# Patient Record
Sex: MALE | Race: WHITE | NOT HISPANIC OR LATINO | URBAN - METROPOLITAN AREA
[De-identification: names, ages, dates, MRNs, and addresses within clinical notes are randomized per-mention and may not be internally consistent; named-entity substitution may affect disease eponyms.]

---

## 2017-04-08 ENCOUNTER — INPATIENT (INPATIENT)
Facility: HOSPITAL | Age: 57
LOS: 2 days | Discharge: HOME | End: 2017-04-11
Attending: INTERNAL MEDICINE | Admitting: INTERNAL MEDICINE
Payer: COMMERCIAL

## 2017-04-08 VITALS
SYSTOLIC BLOOD PRESSURE: 143 MMHG | TEMPERATURE: 98 F | RESPIRATION RATE: 18 BRPM | HEART RATE: 97 BPM | DIASTOLIC BLOOD PRESSURE: 84 MMHG | OXYGEN SATURATION: 99 %

## 2017-04-08 DIAGNOSIS — S92.909A UNSPECIFIED FRACTURE OF UNSPECIFIED FOOT, INITIAL ENCOUNTER FOR CLOSED FRACTURE: Chronic | ICD-10-CM

## 2017-04-08 LAB
ALBUMIN SERPL ELPH-MCNC: 3.3 G/DL — SIGNIFICANT CHANGE UP (ref 3.3–5)
ALP SERPL-CCNC: 67 U/L — SIGNIFICANT CHANGE UP (ref 40–120)
ALT FLD-CCNC: 28 U/L — SIGNIFICANT CHANGE UP (ref 4–41)
AMPHET UR-MCNC: NEGATIVE — SIGNIFICANT CHANGE UP
APAP SERPL-MCNC: < 15 UG/ML — LOW (ref 15–25)
APPEARANCE UR: CLEAR — SIGNIFICANT CHANGE UP
AST SERPL-CCNC: 28 U/L — SIGNIFICANT CHANGE UP (ref 4–40)
BARBITURATES MEASUREMENT: NEGATIVE — SIGNIFICANT CHANGE UP
BARBITURATES UR SCN-MCNC: NEGATIVE — SIGNIFICANT CHANGE UP
BASOPHILS # BLD AUTO: 0.09 K/UL — SIGNIFICANT CHANGE UP (ref 0–0.2)
BASOPHILS NFR BLD AUTO: 0.8 % — SIGNIFICANT CHANGE UP (ref 0–2)
BASOPHILS NFR SPEC: 0 % — SIGNIFICANT CHANGE UP (ref 0–2)
BENZODIAZ SERPL-MCNC: NEGATIVE — SIGNIFICANT CHANGE UP
BENZODIAZ UR-MCNC: NEGATIVE — SIGNIFICANT CHANGE UP
BILIRUB SERPL-MCNC: 0.3 MG/DL — SIGNIFICANT CHANGE UP (ref 0.2–1.2)
BILIRUB UR-MCNC: NEGATIVE — SIGNIFICANT CHANGE UP
BLOOD UR QL VISUAL: NEGATIVE — SIGNIFICANT CHANGE UP
BUN SERPL-MCNC: 11 MG/DL — SIGNIFICANT CHANGE UP (ref 7–23)
CALCIUM SERPL-MCNC: 9.5 MG/DL — SIGNIFICANT CHANGE UP (ref 8.4–10.5)
CANNABINOIDS UR-MCNC: NEGATIVE — SIGNIFICANT CHANGE UP
CHLORIDE SERPL-SCNC: 96 MMOL/L — LOW (ref 98–107)
CHLORIDE UR-SCNC: 57 MMOL/L — SIGNIFICANT CHANGE UP
CK MB BLD-MCNC: 1 NG/ML — SIGNIFICANT CHANGE UP (ref 1–6.6)
CK MB BLD-MCNC: SIGNIFICANT CHANGE UP (ref 0–2.5)
CK SERPL-CCNC: 29 U/L — LOW (ref 30–200)
CO2 SERPL-SCNC: 24 MMOL/L — SIGNIFICANT CHANGE UP (ref 22–31)
COCAINE METAB.OTHER UR-MCNC: NEGATIVE — SIGNIFICANT CHANGE UP
COLOR SPEC: YELLOW — SIGNIFICANT CHANGE UP
CORTIS SERPL-MCNC: 7.2 UG/DL — SIGNIFICANT CHANGE UP (ref 2.7–18.4)
CREAT ?TM UR-MCNC: 247.98 MG/DL — SIGNIFICANT CHANGE UP
CREAT SERPL-MCNC: 0.77 MG/DL — SIGNIFICANT CHANGE UP (ref 0.5–1.3)
CRP SERPL-MCNC: 215.3 MG/L — HIGH (ref 0.3–5)
EOSINOPHIL # BLD AUTO: 0.31 K/UL — SIGNIFICANT CHANGE UP (ref 0–0.5)
EOSINOPHIL NFR BLD AUTO: 2.7 % — SIGNIFICANT CHANGE UP (ref 0–6)
EOSINOPHIL NFR FLD: 2 % — SIGNIFICANT CHANGE UP (ref 0–6)
ERYTHROCYTE [SEDIMENTATION RATE] IN BLOOD: 105 MM/HR — HIGH (ref 1–15)
ETHANOL BLD-MCNC: < 10 MG/DL — SIGNIFICANT CHANGE UP
GLUCOSE SERPL-MCNC: 99 MG/DL — SIGNIFICANT CHANGE UP (ref 70–99)
GLUCOSE UR-MCNC: NEGATIVE — SIGNIFICANT CHANGE UP
HBA1C BLD-MCNC: 5.9 % — HIGH (ref 4–5.6)
HCT VFR BLD CALC: 31.5 % — LOW (ref 39–50)
HGB BLD-MCNC: 10.1 G/DL — LOW (ref 13–17)
IMM GRANULOCYTES NFR BLD AUTO: 0.4 % — SIGNIFICANT CHANGE UP (ref 0–1.5)
KETONES UR-MCNC: NEGATIVE — SIGNIFICANT CHANGE UP
LEUKOCYTE ESTERASE UR-ACNC: NEGATIVE — SIGNIFICANT CHANGE UP
LYMPHOCYTES # BLD AUTO: 19.7 % — SIGNIFICANT CHANGE UP (ref 13–44)
LYMPHOCYTES # BLD AUTO: 2.29 K/UL — SIGNIFICANT CHANGE UP (ref 1–3.3)
LYMPHOCYTES NFR SPEC AUTO: 26 % — SIGNIFICANT CHANGE UP (ref 13–44)
MAGNESIUM SERPL-MCNC: 2 MG/DL — SIGNIFICANT CHANGE UP (ref 1.6–2.6)
MANUAL SMEAR VERIFICATION: SIGNIFICANT CHANGE UP
MCHC RBC-ENTMCNC: 26.6 PG — LOW (ref 27–34)
MCHC RBC-ENTMCNC: 32.1 % — SIGNIFICANT CHANGE UP (ref 32–36)
MCV RBC AUTO: 83.1 FL — SIGNIFICANT CHANGE UP (ref 80–100)
METHADONE UR-MCNC: NEGATIVE — SIGNIFICANT CHANGE UP
MICROCYTES BLD QL: SLIGHT — SIGNIFICANT CHANGE UP
MONOCYTES # BLD AUTO: 1.14 K/UL — HIGH (ref 0–0.9)
MONOCYTES NFR BLD AUTO: 9.8 % — SIGNIFICANT CHANGE UP (ref 2–14)
MONOCYTES NFR BLD: 11 % — HIGH (ref 2–9)
MUCOUS THREADS # UR AUTO: SIGNIFICANT CHANGE UP
MYELOCYTES NFR BLD: 1 % — HIGH (ref 0–0)
NEUTROPHIL AB SER-ACNC: 60 % — SIGNIFICANT CHANGE UP (ref 43–77)
NEUTROPHILS # BLD AUTO: 7.72 K/UL — HIGH (ref 1.8–7.4)
NEUTROPHILS NFR BLD AUTO: 66.6 % — SIGNIFICANT CHANGE UP (ref 43–77)
NITRITE UR-MCNC: NEGATIVE — SIGNIFICANT CHANGE UP
NT-PROBNP SERPL-SCNC: 163.5 PG/ML — SIGNIFICANT CHANGE UP
OPIATES UR-MCNC: NEGATIVE — SIGNIFICANT CHANGE UP
OXYCODONE UR-MCNC: NEGATIVE — SIGNIFICANT CHANGE UP
PCP UR-MCNC: NEGATIVE — SIGNIFICANT CHANGE UP
PH UR: 5.5 — SIGNIFICANT CHANGE UP (ref 4.6–8)
PHOSPHATE SERPL-MCNC: 3.6 MG/DL — SIGNIFICANT CHANGE UP (ref 2.5–4.5)
PLATELET # BLD AUTO: 631 K/UL — HIGH (ref 150–400)
PLATELET COUNT - ESTIMATE: SIGNIFICANT CHANGE UP
PMV BLD: 8.8 FL — SIGNIFICANT CHANGE UP (ref 7–13)
POTASSIUM SERPL-MCNC: 4 MMOL/L — SIGNIFICANT CHANGE UP (ref 3.5–5.3)
POTASSIUM SERPL-SCNC: 4 MMOL/L — SIGNIFICANT CHANGE UP (ref 3.5–5.3)
POTASSIUM UR-SCNC: 65.2 MEQ/L — SIGNIFICANT CHANGE UP
PROT SERPL-MCNC: 8.2 G/DL — SIGNIFICANT CHANGE UP (ref 6–8.3)
PROT SERPL-MCNC: 8.4 G/DL — HIGH (ref 6–8.3)
PROT UR-MCNC: 30 — SIGNIFICANT CHANGE UP
RBC # BLD: 3.79 M/UL — LOW (ref 4.2–5.8)
RBC # FLD: 14 % — SIGNIFICANT CHANGE UP (ref 10.3–14.5)
RBC CASTS # UR COMP ASSIST: SIGNIFICANT CHANGE UP (ref 0–?)
SALICYLATES SERPL-MCNC: < 5 MG/DL — LOW (ref 15–30)
SODIUM SERPL-SCNC: 137 MMOL/L — SIGNIFICANT CHANGE UP (ref 135–145)
SODIUM UR-SCNC: 55 MEQ/L — SIGNIFICANT CHANGE UP
SP GR SPEC: 1.02 — SIGNIFICANT CHANGE UP (ref 1–1.03)
TROPONIN T SERPL-MCNC: < 0.06 NG/ML — SIGNIFICANT CHANGE UP (ref 0–0.06)
TSH SERPL-MCNC: 2 UIU/ML — SIGNIFICANT CHANGE UP (ref 0.27–4.2)
UROBILINOGEN FLD QL: 1 E.U. — SIGNIFICANT CHANGE UP (ref 0.1–0.2)
WBC # BLD: 11.6 K/UL — HIGH (ref 3.8–10.5)
WBC # FLD AUTO: 11.6 K/UL — HIGH (ref 3.8–10.5)
WBC UR QL: SIGNIFICANT CHANGE UP (ref 0–?)

## 2017-04-08 PROCEDURE — 76700 US EXAM ABDOM COMPLETE: CPT | Mod: 26

## 2017-04-08 PROCEDURE — 71020: CPT | Mod: 26

## 2017-04-08 NOTE — ED PROVIDER NOTE - MEDICAL DECISION MAKING DETAILS
58 y/o M p/w anasarca, unclear etiology despite recent work up from outside hospital, will send labs including renal and liver studies, reassess. 56 y/o M p/w anasarca, unclear etiology despite recent work up from outside hospital, will send labs including renal and liver studies, repeat renal sono, reassess.

## 2017-04-08 NOTE — ED ADULT NURSE NOTE - OBJECTIVE STATEMENT
Patient a&ox4, ambulatory with assistance, complains of generalized edema x 1 month with intermittent fevers post ibuprofen regimen for left knee swelling.  Patient attests to several months of rehabilitation post breaking left ankle, developing dvt/PE, and then edema development. Patient states last week evaluated, PE resolved. Cap refill noted to be >2 seconds. Patient appears uncomfortable with movement. febrile last night, resolved without medication.

## 2017-04-08 NOTE — ED ADULT TRIAGE NOTE - CHIEF COMPLAINT QUOTE
Patient states that he had a DVT in September and placed on Lovenox. Left knee blew out in December and he was placed on Motrin, lovenox and diuretic dc'd. Pt presents with edema to both hands and feet and pain in left calf muscle. He feels weak, has been running fevers intermittently and feels like his muscles are wasting and aching.

## 2017-04-08 NOTE — ED PROVIDER NOTE - CHPI ED SYMPTOMS POS
PAIN/EDEMA/BL feet/ankles/hands edema, HA, GI disturbances, body aches, fever BL feet/ankles/hands edema w/ weakness, HA, GI disturbances, body aches, fever/EDEMA/PAIN

## 2017-04-08 NOTE — ED PROVIDER NOTE - OBJECTIVE STATEMENT
56 y/o M, w/ PMHx of R calf DVT, HTN, R foot fx, presents to ED c/o worsening edema to BL ankles, feet, and hands [id number]. Reports in September 2016 he broke his foot and developed a R calf DVT. Starting taking Lovenox December 2016 and one day woke up w/ spontaneous L knee swelling however still continued Lovenox; negative for RA factor, lime dz, and gout per testing. Had L knee drained x5 in total and notes he was not taking any NSAIDs due to Lovenox. States in February 2017 the L knee had improved and pt had discontinued Lovenox then started NSAIDs 800mg ibuprofen x3/day. Since then developing edema in BL feet, ankles and hands. States was on a HTN med but discontinued ever since he started Lovenox, started HTN medication again but stopped again x1wk ago. Endorses GI disturbances, HA, body aches, and fever(Tmax 101F). Believes he is "losing ability to walk." Denies double vision, and other complaints. Had multiple testing done recently including blood tests, CT scan, MRI. Recently admitted to [facility] for x1[facility]. 56 y/o M, w/ PMHx of R calf DVT, HTN, R foot fx, presents to ED c/o worsening edema to BL ankles, feet, and hands [id number] w/ associated weakness. Reports in September 2016 he broke his foot and developed a R calf DVT. Starting taking Lovenox December 2016 and one day woke up w/ spontaneous L knee swelling however still continued Lovenox; negative for RA factor, lime dz, and gout per testing. Had L knee drained x5 in total and notes he was not taking any NSAIDs due to Lovenox. States in February 2017 the L knee had improved and pt had discontinued Lovenox then started NSAIDs 800mg ibuprofen x3/day. Since then developing edema in BL feet, ankles and hands. States was on a HTN med but discontinued ever since he started Lovenox, started HTN medication again but stopped again x1wk ago. Endorses GI disturbances, HA, body aches, and fever(Tmax 101F). Believes he is "losing ability to walk." Denies double vision, and other complaints. Had multiple testing done recently including blood tests, CT scan, MRI. Recently admitted to [facility] for x1day. 58 y/o M, w/ PMHx of R calf DVT (2/2/ foot fx), HTN, R foot fx, presents to ED c/o worsening edema to BL ankles, feet, and hands [id number] w/ associated progressive weakness. Reports in September 2016 he broke his foot and developed a R calf DVT. Starting taking Lovenox December 2016 and one day woke up w/ spontaneous L knee swelling however still continued Lovenox; negative for Rheum factor, and gout per testing. Had L knee drained x5 in total and notes he was not taking any NSAIDs due to Lovenox. States in February 2017 the L knee had improved and pt had discontinued Lovenox then started NSAIDs 800mg ibuprofen x3/day. Since then developing edema in BL feet, ankles and hands. States was on a HTN med but discontinued ever since he started Lovenox, started HTN medication again but stopped again x1wk ago. Endorses GI disturbances, HA, body aches, and fever(Tmax 101F). Believes he is "losing ability to walk." Denies double vision, and other complaints. Had multiple testing done recently including blood tests, CT abd, renal sono (debris vs mass in bladder), UE and LE duplexes without new dvt. Recently admitted to [facility] for x 1 day. Also has been having HA and some vertigo. 20 lbs weight losso deena last few months.

## 2017-04-08 NOTE — ED PROVIDER NOTE - NS ED MD SCRIBE ATTENDING SCRIBE SECTIONS
REVIEW OF SYSTEMS/PAST MEDICAL/SURGICAL/SOCIAL HISTORY/HIV/DISPOSITION/HISTORY OF PRESENT ILLNESS/VITAL SIGNS( Pullset)

## 2017-04-09 DIAGNOSIS — S73.192S OTHER SPRAIN OF LEFT HIP, SEQUELA: Chronic | ICD-10-CM

## 2017-04-09 DIAGNOSIS — I10 ESSENTIAL (PRIMARY) HYPERTENSION: ICD-10-CM

## 2017-04-09 DIAGNOSIS — R60.1 GENERALIZED EDEMA: ICD-10-CM

## 2017-04-09 LAB
ALBUMIN SERPL ELPH-MCNC: 3.1 G/DL — LOW (ref 3.3–5)
ALP SERPL-CCNC: 68 U/L — SIGNIFICANT CHANGE UP (ref 40–120)
ALT FLD-CCNC: 30 U/L — SIGNIFICANT CHANGE UP (ref 4–41)
APTT BLD: 31.4 SEC — SIGNIFICANT CHANGE UP (ref 27.5–37.4)
AST SERPL-CCNC: 30 U/L — SIGNIFICANT CHANGE UP (ref 4–40)
BASOPHILS # BLD AUTO: 0.08 K/UL — SIGNIFICANT CHANGE UP (ref 0–0.2)
BASOPHILS NFR BLD AUTO: 0.8 % — SIGNIFICANT CHANGE UP (ref 0–2)
BILIRUB SERPL-MCNC: 0.3 MG/DL — SIGNIFICANT CHANGE UP (ref 0.2–1.2)
BUN SERPL-MCNC: 9 MG/DL — SIGNIFICANT CHANGE UP (ref 7–23)
C3 SERPL-MCNC: 171.1 MG/DL — SIGNIFICANT CHANGE UP (ref 90–180)
C4 SERPL-MCNC: 24.3 MG/DL — SIGNIFICANT CHANGE UP (ref 10–40)
CALCIUM SERPL-MCNC: 9.4 MG/DL — SIGNIFICANT CHANGE UP (ref 8.4–10.5)
CHLORIDE SERPL-SCNC: 97 MMOL/L — LOW (ref 98–107)
CO2 SERPL-SCNC: 22 MMOL/L — SIGNIFICANT CHANGE UP (ref 22–31)
CREAT SERPL-MCNC: 0.63 MG/DL — SIGNIFICANT CHANGE UP (ref 0.5–1.3)
EOSINOPHIL # BLD AUTO: 0.31 K/UL — SIGNIFICANT CHANGE UP (ref 0–0.5)
EOSINOPHIL NFR BLD AUTO: 3.2 % — SIGNIFICANT CHANGE UP (ref 0–6)
GLUCOSE SERPL-MCNC: 102 MG/DL — HIGH (ref 70–99)
HBA1C BLD-MCNC: 5.7 % — HIGH (ref 4–5.6)
HBV SURFACE AB SER-ACNC: SIGNIFICANT CHANGE UP
HBV SURFACE AG SER-ACNC: NEGATIVE — SIGNIFICANT CHANGE UP
HCT VFR BLD CALC: 28.8 % — LOW (ref 39–50)
HCV AB S/CO SERPL IA: 0.17 S/CO — SIGNIFICANT CHANGE UP
HCV AB SERPL-IMP: SIGNIFICANT CHANGE UP
HGB BLD-MCNC: 9.2 G/DL — LOW (ref 13–17)
IMM GRANULOCYTES NFR BLD AUTO: 0.4 % — SIGNIFICANT CHANGE UP (ref 0–1.5)
INR BLD: 1.2 — HIGH (ref 0.88–1.17)
INR BLD: 1.2 — HIGH (ref 0.88–1.17)
LACTATE SERPL-SCNC: 0.7 MMOL/L — SIGNIFICANT CHANGE UP (ref 0.5–2)
LYMPHOCYTES # BLD AUTO: 1.74 K/UL — SIGNIFICANT CHANGE UP (ref 1–3.3)
LYMPHOCYTES # BLD AUTO: 17.8 % — SIGNIFICANT CHANGE UP (ref 13–44)
MAGNESIUM SERPL-MCNC: 1.8 MG/DL — SIGNIFICANT CHANGE UP (ref 1.6–2.6)
MCHC RBC-ENTMCNC: 26.2 PG — LOW (ref 27–34)
MCHC RBC-ENTMCNC: 31.9 % — LOW (ref 32–36)
MCV RBC AUTO: 82.1 FL — SIGNIFICANT CHANGE UP (ref 80–100)
MONOCYTES # BLD AUTO: 1.09 K/UL — HIGH (ref 0–0.9)
MONOCYTES NFR BLD AUTO: 11.2 % — SIGNIFICANT CHANGE UP (ref 2–14)
NEUTROPHILS # BLD AUTO: 6.51 K/UL — SIGNIFICANT CHANGE UP (ref 1.8–7.4)
NEUTROPHILS NFR BLD AUTO: 66.6 % — SIGNIFICANT CHANGE UP (ref 43–77)
PHOSPHATE SERPL-MCNC: 3.9 MG/DL — SIGNIFICANT CHANGE UP (ref 2.5–4.5)
PLATELET # BLD AUTO: 584 K/UL — HIGH (ref 150–400)
PMV BLD: 8.8 FL — SIGNIFICANT CHANGE UP (ref 7–13)
POTASSIUM SERPL-MCNC: 4.1 MMOL/L — SIGNIFICANT CHANGE UP (ref 3.5–5.3)
POTASSIUM SERPL-SCNC: 4.1 MMOL/L — SIGNIFICANT CHANGE UP (ref 3.5–5.3)
PROT SERPL-MCNC: 7.6 G/DL — SIGNIFICANT CHANGE UP (ref 6–8.3)
PROTHROM AB SERPL-ACNC: 13.5 SEC — HIGH (ref 9.8–13.1)
PROTHROM AB SERPL-ACNC: 13.5 SEC — HIGH (ref 9.8–13.1)
RBC # BLD: 3.51 M/UL — LOW (ref 4.2–5.8)
RBC # FLD: 14 % — SIGNIFICANT CHANGE UP (ref 10.3–14.5)
RHEUMATOID FACT SERPL-ACNC: 9.8 IU/ML — SIGNIFICANT CHANGE UP
SODIUM SERPL-SCNC: 136 MMOL/L — SIGNIFICANT CHANGE UP (ref 135–145)
SPECIMEN SOURCE: SIGNIFICANT CHANGE UP
SPECIMEN SOURCE: SIGNIFICANT CHANGE UP
THROMBIN TIME: 20.2 SEC — SIGNIFICANT CHANGE UP (ref 17–26)
WBC # BLD: 9.77 K/UL — SIGNIFICANT CHANGE UP (ref 3.8–10.5)
WBC # FLD AUTO: 9.77 K/UL — SIGNIFICANT CHANGE UP (ref 3.8–10.5)

## 2017-04-09 PROCEDURE — 99223 1ST HOSP IP/OBS HIGH 75: CPT | Mod: GC

## 2017-04-09 RX ORDER — ACETAMINOPHEN 500 MG
650 TABLET ORAL ONCE
Qty: 0 | Refills: 0 | Status: COMPLETED | OUTPATIENT
Start: 2017-04-09 | End: 2017-04-09

## 2017-04-09 RX ORDER — SODIUM CHLORIDE 9 MG/ML
1000 INJECTION INTRAMUSCULAR; INTRAVENOUS; SUBCUTANEOUS
Qty: 0 | Refills: 0 | Status: DISCONTINUED | OUTPATIENT
Start: 2017-04-09 | End: 2017-04-10

## 2017-04-09 RX ORDER — ACETAMINOPHEN 500 MG
650 TABLET ORAL EVERY 6 HOURS
Qty: 0 | Refills: 0 | Status: DISCONTINUED | OUTPATIENT
Start: 2017-04-09 | End: 2017-04-11

## 2017-04-09 RX ORDER — HEPARIN SODIUM 5000 [USP'U]/ML
5000 INJECTION INTRAVENOUS; SUBCUTANEOUS EVERY 8 HOURS
Qty: 0 | Refills: 0 | Status: DISCONTINUED | OUTPATIENT
Start: 2017-04-09 | End: 2017-04-11

## 2017-04-09 RX ADMIN — Medication 650 MILLIGRAM(S): at 07:02

## 2017-04-09 RX ADMIN — Medication 650 MILLIGRAM(S): at 00:45

## 2017-04-09 RX ADMIN — Medication 650 MILLIGRAM(S): at 14:15

## 2017-04-09 RX ADMIN — Medication 650 MILLIGRAM(S): at 15:15

## 2017-04-09 RX ADMIN — HEPARIN SODIUM 5000 UNIT(S): 5000 INJECTION INTRAVENOUS; SUBCUTANEOUS at 21:06

## 2017-04-09 RX ADMIN — Medication 650 MILLIGRAM(S): at 08:00

## 2017-04-09 RX ADMIN — Medication 650 MILLIGRAM(S): at 00:15

## 2017-04-09 NOTE — DISCHARGE NOTE ADULT - CARE PLAN
Principal Discharge DX:	Polyarticular arthritis  Goal:	Management  Instructions for follow-up, activity and diet:	You were admitted to the hospital secondary to increasingly worsening edema of multiple joints associated with elevated blood levels of inflammatory markers. You had multiple labs checked for rheumatologic and infectious processes while you were admitted. Of those tests which resulted during your stay, all were negative. You had additional tests which did not result while you were admitted. For this, you should follow up with your primary care doctor and a rheumatologist in the office after you are discharged.  Secondary Diagnosis:	HTN (hypertension) Principal Discharge DX:	Polyarticular arthritis  Goal:	Management  Instructions for follow-up, activity and diet:	You were admitted to the hospital secondary to increasingly worsening edema of multiple joints associated with elevated blood levels of inflammatory markers. You had multiple labs checked for rheumatologic and infectious processes while you were admitted. Of those tests which resulted during your stay, all were negative. You had additional tests which did not result while you were admitted. For this, you should follow up with your primary care doctor and a rheumatologist in the office after you are discharged.  Secondary Diagnosis:	HTN (hypertension)  Instructions for follow-up, activity and diet:	You have a history of hypertension for which you previously took blood pressure medications. These medications were held during your hospitalization and your blood pressure remained within normal limits. You should follow up with your primary care doctor in the office regarding your diagnosis and the need to restart medications.

## 2017-04-09 NOTE — H&P ADULT. - RS GEN PE MLT RESP DETAILS PC
no chest wall tenderness/no rhonchi/good air movement/clear to auscultation bilaterally/no wheezes/no rales/no intercostal retractions/respirations non-labored/normal/airway patent/breath sounds equal

## 2017-04-09 NOTE — H&P ADULT. - HISTORY OF PRESENT ILLNESS
On arrival to the ED, VS: T98.5, HR97, /84, RR18, SpO2 99% on room air. Mr. Rogers is a 57M with history of HTN, remote history of nephritis (as a child), R foot fracture (09/2016) c/b R calf DVT, multiple orthopaedic injuries (s/p repair, including L hip labrum tear with impingement of the ?femur), who presents with several months of painful edema of the hands and feet. The patient reports that last summer, he went hiking with a friend, and noticed ticks on his clothing, but did not notice any ticks on his skin or tick bites; fearing he may have been exposed to Lyme Disease, he went to a Dermatologist, who did a full skin examination, which was negative for erythema migrans. In September 2016, patient sustained a R 5th metatarsal fracture after a mechanical fall (he denies pre-syncope/syncope/preceding palpitations), which was repaired. Approximately 3 days later, he developed a DVT in the R calf, and was started on Lovenox. He was discharged to rehab and was doing well until December he developed a sizeable joint effusion in the L knee. Patient has since had recurrent joint effusions in the L knee, requiring 5 separate arthrocenteses and joint aspiration, done by his Orthopaedic Surgeon (Dr. Ann of [facility] and [facility]). Patient says that the joint aspirates were negative for crystals, infection or lyme. In late February he was started on high-dose Ibuprofen 800mg TID, which he took for a week, and self-tapered to 400mg TID. Patient had been on Irbesartan-HCTZ for years, but HCTZ and Lovenox stopped during this time as well. Around this time, he developed waxing-waning, painful edema of the feet bilaterally, extending up past the ankles into the calves. In early March, he developed swelling of the hands, extending past the wrists, with pain and restricted range of motion in his fingers. Since, patient has had worsening pain in the calf muscles, without weakness. He saw his PCP who discontinued the Ibuprofen (given fear that edema was from worsening renal dysfunction). A week ago, patient discontinued taking Irbesartan. Patient has had fevers to 101F over the past 2 weeks, with malaise/fatigue. He denies any new rashes, but says that he had a scaly rash in the scalp for years, which has recently resolved. No chest pain, dyspnea, nausea/vomiting. He endorses a "head rush" with sudden movements of his head. Incidentally, patient had a week-long episode of "diarrhea" 3 weeks ago, without melena or hematochezia.       On arrival to the ED, VS: T98.5, HR97, /84, RR18, SpO2 99% on room air. Patient requesting Tylenol for pains of the hands and feet. Mr. Rogers is a 57M with family history of polymyalgia rheumatica in his mother and personal history of HTN, remote history of nephritis (as a child), R foot fracture (09/2016) c/b R calf DVT, multiple orthopaedic injuries (s/p repair, including L hip labrum tear with impingement of the ?femur), ?psoriasis, who presents with several months of painful edema of the hands and feet. The patient reports that last summer, he went hiking with a friend, and noticed ticks on his clothing, but did not notice any ticks on his skin or tick bites; fearing he may have been exposed to Lyme Disease, he went to a Dermatologist, who did a full skin examination, which was negative for erythema migrans. In September 2016, patient sustained a R 5th metatarsal fracture after a mechanical fall (he denies pre-syncope/syncope/preceding palpitations), which was repaired. Approximately 3 days later, he developed a DVT in the R calf, and was started on Lovenox. He was discharged to rehab and was doing well until December he developed a sizeable joint effusion in the L knee. Patient has since had recurrent joint effusions in the L knee, requiring 5 separate arthrocenteses and joint aspiration, done by his Orthopaedic Surgeon (Dr. Ann of [facility]). Patient says that the joint aspirates were negative for crystals, infection or lyme. In late February he was started on high-dose Ibuprofen 800mg TID, which he took for a week, and self-tapered to 400mg TID. Patient had been on Irbesartan-HCTZ for years, but HCTZ and Lovenox stopped during this time as well. Around this time, he developed waxing-waning, painful edema of the feet bilaterally, extending up past the ankles into the calves. In early March, he developed swelling of the hands, extending past the wrists, with pain and restricted range of motion in his fingers. Since, patient has had worsening pain in the calf muscles, without weakness. He saw his PCP who discontinued the Ibuprofen (given fear that edema was from worsening renal dysfunction). A week ago, patient discontinued taking Irbesartan. Patient has had fevers to 101F over the past 2 weeks, with malaise/fatigue. He denies any new rashes, but says that he had a scaly rash in the scalp for years, which has recently resolved. No chest pain, dyspnea, nausea/vomiting. He endorses a "head rush" with sudden movements of his head. Incidentally, patient had a week-long episode of "diarrhea" 3 weeks ago, without melena or hematochezia. He denies visual symptoms or HA's.       On arrival to the ED, VS: T98.5, HR97, /84, RR18, SpO2 99% on room air. Patient requesting Tylenol for pains of the hands and feet.

## 2017-04-09 NOTE — H&P ADULT. - GASTROINTESTINAL DETAILS
no guarding/no organomegaly/no rebound tenderness/normal/no bruit/no distention/soft/no masses palpable/bowel sounds normal/no rigidity/nontender

## 2017-04-09 NOTE — DISCHARGE NOTE ADULT - PATIENT PORTAL LINK FT
“You can access the FollowHealth Patient Portal, offered by [facility], by registering with the following website: [email or web address]”

## 2017-04-09 NOTE — DISCHARGE NOTE ADULT - PROVIDER TOKENS
FREE:[LAST:[Bonny],FIRST:[MD Delmi (PCP)],PHONE:[phone]:[(   )    -],ADDRESS:[[address] [place] [address] [address] [address]]] FREE:[LAST:[Bonny],FIRST:[MD Delmi (PCP)],PHONE:[phone]],FAX:[(   )    -],ADDRESS:[[address] [place] [address] [address] [address]]],TOKEN:'[id number]'

## 2017-04-09 NOTE — DISCHARGE NOTE ADULT - CARE PROVIDER_API CALL
Delmi Draper MD (PCP)  [address],  [place] [address]  Phone: [phone]  Fax: (   )    - Delmi Draper MD (PCP)  [address],  [place] [address]  Phone: [phone]  Fax: (   )    -    Maryam Lucas (MBBS; MPH), Internal Medicine; Rheumatology  [address] [place] [id number]  Phone: [phone]  Fax: [phone]

## 2017-04-09 NOTE — DISCHARGE NOTE ADULT - ADDITIONAL INSTRUCTIONS
Follow up with your primary care doctor in the office within one week of discharge. Follow up with a rheumatologist in the office after you are discharged. Follow up with your primary care doctor in the office within one week of discharge. Follow up with a rheumatologist in the office after you are discharged. Please call the office at [phone] to make an appointment with Dr. Lucas of rheumatology.

## 2017-04-09 NOTE — H&P ADULT. - ASSESSMENT
In summary, this is a very interesting 57M with family history of polymyalgia rheumatica in his mother and personal history of complicated R foot fracture, presenting with worsening In summary, this is a very interesting 57M with family history of polymyalgia rheumatica in his mother and personal history of complicated R foot fracture, presenting with worsening painful edema of the extremities, concerning for underlying infectious vs. autoimmune process.

## 2017-04-09 NOTE — PATIENT PROFILE ADULT. - NS PRO MODE OF ARRIVAL
“You can access the FollowHealth Patient Portal, offered by [facility], by registering with the following website: [email or web address]”
car

## 2017-04-09 NOTE — H&P ADULT. - PMH
DVT (deep venous thrombosis)  R calf  HTN (hypertension)    Nephritis  as a child DVT (deep venous thrombosis)  R calf  HTN (hypertension)    Metatarsal fracture, pathologic, right, sequela    Nephritis  as a child

## 2017-04-09 NOTE — DISCHARGE NOTE ADULT - MEDICATION SUMMARY - MEDICATIONS TO TAKE
I will START or STAY ON the medications listed below when I get home from the hospital:    acetaminophen 325 mg oral tablet  -- 2 tab(s) by mouth every 6 hours, As needed, moderate-severe pain  -- Indication: For Pain

## 2017-04-09 NOTE — DISCHARGE NOTE ADULT - HOSPITAL COURSE
Mr. Rogers is a 57M with family history of polymyalgia rheumatica in his mother and personal history of HTN, remote history of nephritis (as a child), R foot fracture (09/2016) c/b R calf DVT, multiple orthopaedic injuries (s/p repair, including L hip labrum tear with impingement of the ?femur), ?psoriasis, who presents with several months of painful edema of the hands and feet. The patient reports that last summer, he went hiking with a friend, and noticed ticks on his clothing, but did not notice any ticks on his skin or tick bites; fearing he may have been exposed to Lyme Disease, he went to a Dermatologist, who did a full skin examination, which was negative for erythema migrans. In September 2016, patient sustained a R 5th metatarsal fracture after a mechanical fall (he denies pre-syncope/syncope/preceding palpitations), which was repaired. Approximately 3 days later, he developed a DVT in the R calf, and was started on Lovenox. He was discharged to rehab and was doing well until December he developed a sizeable joint effusion in the L knee. Patient has since had recurrent joint effusions in the L knee, requiring 5 separate arthrocenteses and joint aspiration, done by his Orthopaedic Surgeon (Dr. Ann of [facility] and [facility]). Patient says that the joint aspirates were negative for crystals, infection or lyme. In late February he was started on high-dose Ibuprofen 800mg TID, which he took for a week, and self-tapered to 400mg TID. Patient had been on Irbesartan-HCTZ for years, but HCTZ and Lovenox stopped during this time as well. Around this time, he developed waxing-waning, painful edema of the feet bilaterally, extending up past the ankles into the calves. In early March, he developed swelling of the hands, extending past the wrists, with pain and restricted range of motion in his fingers. Since, patient has had worsening pain in the calf muscles, without weakness. He saw his PCP who discontinued the Ibuprofen (given fear that edema was from worsening renal dysfunction). A week ago, patient discontinued taking Irbesartan. Patient has had fevers to 101F over the past 2 weeks, with malaise/fatigue. He denies any new rashes, but says that he had a scaly rash in the scalp for years, which has recently resolved. No chest pain, dyspnea, nausea/vomiting. He endorses a "head rush" with sudden movements of his head. Incidentally, patient had a week-long episode of "diarrhea" 3 weeks ago, without melena or hematochezia. He denies visual symptoms or HA's.       On arrival to the ED, VS: T98.5, HR97, /84, RR18, SpO2 99% on room air. Patient requesting Tylenol for pains of the hands and feet. Hospital Admission  Mr. Rogers is a 57M with family history of polymyalgia rheumatica in his mother and personal history of HTN, remote history of nephritis (as a child), R foot fracture (09/2016) c/b R calf DVT, multiple orthopaedic injuries (s/p repair, including L hip labrum tear with impingement of the ?femur), ?psoriasis, who presents with several months of painful edema of the hands and feet. The patient reports that last summer, he went hiking with a friend, and noticed ticks on his clothing, but did not notice any ticks on his skin or tick bites; fearing he may have been exposed to Lyme Disease, he went to a Dermatologist, who did a full skin examination, which was negative for erythema migrans. In September 2016, patient sustained a R 5th metatarsal fracture after a mechanical fall (he denies pre-syncope/syncope/preceding palpitations), which was repaired. Approximately 3 days later, he developed a DVT in the R calf, and was started on Lovenox. He was discharged to rehab and was doing well until December he developed a sizeable joint effusion in the L knee. Patient has since had recurrent joint effusions in the L knee, requiring 5 separate arthrocenteses and joint aspiration, done by his Orthopaedic Surgeon (Dr. Ann of [facility] and [facility]). Patient says that the joint aspirates were negative for crystals, infection or lyme. In late February he was started on high-dose Ibuprofen 800mg TID, which he took for a week, and self-tapered to 400mg TID. Patient had been on Irbesartan-HCTZ for years, but HCTZ and Lovenox stopped during this time as well. Around this time, he developed waxing-waning, painful edema of the feet bilaterally, extending up past the ankles into the calves. In early March, he developed swelling of the hands, extending past the wrists, with pain and restricted range of motion in his fingers. Since, patient has had worsening pain in the calf muscles, without weakness. He saw his PCP who discontinued the Ibuprofen (given fear that edema was from worsening renal dysfunction). A week ago, patient discontinued taking Irbesartan. Patient has had fevers to 101F over the past 2 weeks, with malaise/fatigue. He denies any new rashes, but says that he had a scaly rash in the scalp for years, which has recently resolved. No chest pain, dyspnea, nausea/vomiting. He endorses a "head rush" with sudden movements of his head. Incidentally, patient had a week-long episode of "diarrhea" 3 weeks ago, without melena or hematochezia. He denies visual symptoms or HA's.     ED Course  On arrival to the ED, VS: T98.5, HR97, /84, RR18, SpO2 99% on room air. Patient requesting Tylenol for pains of the hands and feet. ESR noted to be markedly elevated at 105 and .3.    Hospital Course  Patient admitted to the medical service to rule out infectious versus rheumatologic causes of polyarticular edema associated with markedly elevated inflammatory markers. Hospital Admission  Mr. Rogers is a 57M with family history of polymyalgia rheumatica in his mother and personal history of HTN, remote history of nephritis (as a child), R foot fracture (09/2016) c/b R calf DVT, multiple orthopaedic injuries (s/p repair, including L hip labrum tear with impingement of the ?femur), ?psoriasis, who presents with several months of painful edema of the hands and feet. The patient reports that last summer, he went hiking with a friend, and noticed ticks on his clothing, but did not notice any ticks on his skin or tick bites; fearing he may have been exposed to Lyme Disease, he went to a Dermatologist, who did a full skin examination, which was negative for erythema migrans. In September 2016, patient sustained a R 5th metatarsal fracture after a mechanical fall (he denies pre-syncope/syncope/preceding palpitations), which was repaired. Approximately 3 days later, he developed a DVT in the R calf, and was started on Lovenox. He was discharged to rehab and was doing well until December he developed a sizeable joint effusion in the L knee. Patient has since had recurrent joint effusions in the L knee, requiring 5 separate arthrocenteses and joint aspiration, done by his Orthopaedic Surgeon (Dr. Ann of [facility] and [facility]). Patient says that the joint aspirates were negative for crystals, infection or lyme. In late February he was started on high-dose Ibuprofen 800mg TID, which he took for a week, and self-tapered to 400mg TID. Patient had been on Irbesartan-HCTZ for years, but HCTZ and Lovenox stopped during this time as well. Around this time, he developed waxing-waning, painful edema of the feet bilaterally, extending up past the ankles into the calves. In early March, he developed swelling of the hands, extending past the wrists, with pain and restricted range of motion in his fingers. Since, patient has had worsening pain in the calf muscles, without weakness. He saw his PCP who discontinued the Ibuprofen (given fear that edema was from worsening renal dysfunction). A week ago, patient discontinued taking Irbesartan. Patient has had fevers to 101F over the past 2 weeks, with malaise/fatigue. He denies any new rashes, but says that he had a scaly rash in the scalp for years, which has recently resolved. No chest pain, dyspnea, nausea/vomiting. He endorses a "head rush" with sudden movements of his head. Incidentally, patient had a week-long episode of "diarrhea" 3 weeks ago, without melena or hematochezia. He denies visual symptoms or HA's.     ED Course  On arrival to the ED, VS: T98.5, HR97, /84, RR18, SpO2 99% on room air. Patient requesting Tylenol for pains of the hands and feet. ESR noted to be markedly elevated at 105 and .3.    Hospital Course  Patient admitted to the medical service to rule out infectious versus rheumatologic causes of polyarticular edema associated with markedly elevated inflammatory markers. Rheumatology consulted - rule out RA vs RS3PE vs parvo-related inflammatory arthritis. CBC showed normocytic anemia to Hb 10. CMP unremarkable. CK 29. UA 30 protein. Utox negative. CXR clear lungs. RF negative, p-ANCA and c-ANCA negative, scleroderma ab negative, lyme ab negative, dsDNA negative, C3/C4 normal. Blood and urine cultures negative. HIV non-reactive. Hep B sAg/Ab negative, Hep C negative. Patient given 20 Lasix PO x 1 without significant diuresis or decrease in edema noted. MRI of patient's wrist ordered per rheumatology, however exam not tolerated secondary to anxiety. Patient ambulating on his own, pain well-controlled with PO Tylenol. Patient afebrile with stable vital signs throughout hospitalization. He was discharged in stable condition with instructions to follow up with his PCP and a rheumatologist after discharge for further work up. Hospital Admission  Mr. Rogers is a 57M with family history of polymyalgia rheumatica in his mother and personal history of HTN, remote history of nephritis (as a child), R foot fracture (09/2016) c/b R calf DVT, multiple orthopaedic injuries (s/p repair, including L hip labrum tear with impingement of the ?femur), ?psoriasis, who presents with several months of painful edema of the hands and feet. The patient reports that last summer, he went hiking with a friend, and noticed ticks on his clothing, but did not notice any ticks on his skin or tick bites; fearing he may have been exposed to Lyme Disease, he went to a Dermatologist, who did a full skin examination, which was negative for erythema migrans. In September 2016, patient sustained a R 5th metatarsal fracture after a mechanical fall (he denies pre-syncope/syncope/preceding palpitations), which was repaired. Approximately 3 days later, he developed a DVT in the R calf, and was started on Lovenox. He was discharged to rehab and was doing well until December he developed a sizeable joint effusion in the L knee. Patient has since had recurrent joint effusions in the L knee, requiring 5 separate arthrocenteses and joint aspiration, done by his Orthopaedic Surgeon (Dr. Ann of [facility] and [facility]). Patient says that the joint aspirates were negative for crystals, infection or lyme. In late February he was started on high-dose Ibuprofen 800mg TID, which he took for a week, and self-tapered to 400mg TID. Patient had been on Irbesartan-HCTZ for years, but HCTZ and Lovenox stopped during this time as well. Around this time, he developed waxing-waning, painful edema of the feet bilaterally, extending up past the ankles into the calves. In early March, he developed swelling of the hands, extending past the wrists, with pain and restricted range of motion in his fingers. Since, patient has had worsening pain in the calf muscles, without weakness. He saw his PCP who discontinued the Ibuprofen (given fear that edema was from worsening renal dysfunction). A week ago, patient discontinued taking Irbesartan. Patient has had fevers to 101F over the past 2 weeks, with malaise/fatigue. He denies any new rashes, but says that he had a scaly rash in the scalp for years, which has recently resolved. No chest pain, dyspnea, nausea/vomiting. He endorses a "head rush" with sudden movements of his head. Incidentally, patient had a week-long episode of "diarrhea" 3 weeks ago, without melena or hematochezia. He denies visual symptoms or HA's.     ED Course  On arrival to the ED, VS: T98.5, HR97, /84, RR18, SpO2 99% on room air. Patient requesting Tylenol for pains of the hands and feet. ESR noted to be markedly elevated at 105 and .3.    Hospital Course  Patient admitted to the medical service to rule out infectious versus rheumatologic causes of polyarticular edema associated with markedly elevated inflammatory markers. Rheumatology consulted - rule out RA vs RS3PE vs parvo-related inflammatory arthritis. CBC showed normocytic anemia to Hb 10. CMP unremarkable. CK 29. UA 30 protein. Utox negative. CXR clear lungs. RF negative, p-ANCA and c-ANCA negative, scleroderma ab negative, lyme ab negative, dsDNA negative, C3/C4 normal. Blood and urine cultures negative. HIV non-reactive. Hep B sAg/Ab negative, Hep C negative. Patient given 20 Lasix PO x 1 without significant diuresis or decrease in edema noted. MRI of patient's wrist ordered per rheumatology, however exam not tolerated secondary to anxiety. Patient ambulating on his own, pain well-controlled with PO Tylenol. Patient afebrile with stable vital signs throughout hospitalization. He was discharged in stable condition with instructions to follow up with his PCP and a rheumatologist after discharge for further work up. Patient's PCP, Dr. Draper, called and plan of care discussed. Per rheumatologist, Dr. Lucas, patient will need MRI R wrist prior to appointment.

## 2017-04-09 NOTE — H&P ADULT. - PROBLEM SELECTOR PLAN 1
- concern for underlying rheumatologic process, especially in light of normal renal/hepatic function   - TTE to rule out underlying cardiac dysfunction   - check serum lyme titers; KRZYSZTOF; dsDNA; p-ANCA/c-ANCA; RF; anti-Smith; anti-Ro/La; a  - evaluate patient for diagnostic arthrocentesis - concern for underlying rheumatologic process, especially in light of normal renal/hepatic function   - TTE to rule out underlying cardiac dysfunction   - check serum lyme antibodies; KRZYSZTOF; dsDNA; p-ANCA/c-ANCA; RF/anti-CCP; anti-Smith; anti-Ro/La; C3/C4; HCV; spot microalbumin/Cr  - evaluate patient for diagnostic arthrocentesis   - Rheumatology evaluation - concern for underlying rheumatologic process, especially in light of normal renal/hepatic function   - TTE to rule out underlying cardiac dysfunction   - check serum lyme antibodies; KRZYSZTOF; dsDNA; p-ANCA/c-ANCA; RF/anti-CCP; anti-Smith; anti-Ro/La; C3/C4; HCV; spot microalbumin/Cr  - evaluate patient for diagnostic arthrocentesis   - Rheumatology consult  - obtain records of prior arthrocentesis from Dr. Ann (Orthopedic Surgery)

## 2017-04-09 NOTE — H&P ADULT. - NEGATIVE CARDIOVASCULAR SYMPTOMS
no palpitations/no paroxysmal nocturnal dyspnea/no claudication/no chest pain/no dyspnea on exertion/no orthopnea

## 2017-04-09 NOTE — H&P ADULT. - FAMILY HISTORY
Mother  Still living? Unknown  Family history of polymyalgia rheumatica, Age at diagnosis: Age Unknown

## 2017-04-09 NOTE — DISCHARGE NOTE ADULT - PLAN OF CARE
Management You were admitted to the hospital secondary to increasingly worsening edema of multiple joints associated with elevated blood levels of inflammatory markers. You had multiple labs checked for rheumatologic and infectious processes while you were admitted. Of those tests which resulted during your stay, all were negative. You had additional tests which did not result while you were admitted. For this, you should follow up with your primary care doctor and a rheumatologist in the office after you are discharged. You have a history of hypertension for which you previously took blood pressure medications. These medications were held during your hospitalization and your blood pressure remained within normal limits. You should follow up with your primary care doctor in the office regarding your diagnosis and the need to restart medications.

## 2017-04-09 NOTE — H&P ADULT. - RADIOLOGY RESULTS AND INTERPRETATION
Abdominal ultrasound: No evidence of cholelithiasis, cholecystitis or biliary ductal dilatation. No hydronephrosis. Hepatosplenomegaly.    CXR: Personally reviewed, no focal opacities.

## 2017-04-09 NOTE — H&P ADULT. - PROBLEM SELECTOR PLAN 3
- TTE to evaluate systolic function - TTE to evaluate systolic function  - CT-abdomen/pelvis with contrast to better characterize architecture of liver - TTE to evaluate systolic function  - CT-abdomen/pelvis with contrast done plast week at [facility]; wife to bring in report - TTE to evaluate systolic function  - CT-abdomen/pelvis with contrast done last week at [facility]; wife to bring in report. determine if hepatosplenomegaly noted.

## 2017-04-10 LAB
ALBUMIN SERPL ELPH-MCNC: 3 G/DL — LOW (ref 3.3–5)
ALP SERPL-CCNC: 73 U/L — SIGNIFICANT CHANGE UP (ref 40–120)
ALT FLD-CCNC: 37 U/L — SIGNIFICANT CHANGE UP (ref 4–41)
AST SERPL-CCNC: 37 U/L — SIGNIFICANT CHANGE UP (ref 4–40)
BACTERIA UR CULT: SIGNIFICANT CHANGE UP
BASOPHILS # BLD AUTO: 0.08 K/UL — SIGNIFICANT CHANGE UP (ref 0–0.2)
BASOPHILS NFR BLD AUTO: 0.9 % — SIGNIFICANT CHANGE UP (ref 0–2)
BILIRUB SERPL-MCNC: 0.3 MG/DL — SIGNIFICANT CHANGE UP (ref 0.2–1.2)
BUN SERPL-MCNC: 7 MG/DL — SIGNIFICANT CHANGE UP (ref 7–23)
C-ANCA SER-ACNC: NEGATIVE — SIGNIFICANT CHANGE UP
CALCIUM SERPL-MCNC: 9.2 MG/DL — SIGNIFICANT CHANGE UP (ref 8.4–10.5)
CHLORIDE SERPL-SCNC: 99 MMOL/L — SIGNIFICANT CHANGE UP (ref 98–107)
CO2 SERPL-SCNC: 24 MMOL/L — SIGNIFICANT CHANGE UP (ref 22–31)
CREAT SERPL-MCNC: 0.65 MG/DL — SIGNIFICANT CHANGE UP (ref 0.5–1.3)
CREAT UR-MCNC: 96 MG/DL — SIGNIFICANT CHANGE UP
EOSINOPHIL # BLD AUTO: 0.34 K/UL — SIGNIFICANT CHANGE UP (ref 0–0.5)
EOSINOPHIL NFR BLD AUTO: 3.8 % — SIGNIFICANT CHANGE UP (ref 0–6)
GLUCOSE SERPL-MCNC: 95 MG/DL — SIGNIFICANT CHANGE UP (ref 70–99)
HCT VFR BLD CALC: 29.6 % — LOW (ref 39–50)
HGB BLD-MCNC: 9.4 G/DL — LOW (ref 13–17)
IMM GRANULOCYTES NFR BLD AUTO: 0.6 % — SIGNIFICANT CHANGE UP (ref 0–1.5)
LYMPHOCYTES # BLD AUTO: 1.97 K/UL — SIGNIFICANT CHANGE UP (ref 1–3.3)
LYMPHOCYTES # BLD AUTO: 22.1 % — SIGNIFICANT CHANGE UP (ref 13–44)
MAGNESIUM SERPL-MCNC: 1.9 MG/DL — SIGNIFICANT CHANGE UP (ref 1.6–2.6)
MCHC RBC-ENTMCNC: 26.2 PG — LOW (ref 27–34)
MCHC RBC-ENTMCNC: 31.8 % — LOW (ref 32–36)
MCV RBC AUTO: 82.5 FL — SIGNIFICANT CHANGE UP (ref 80–100)
MICROALBUMIN UR-MCNC: 0.4 MG/DL — SIGNIFICANT CHANGE UP
MICROALBUMIN/CREAT UR-RTO: 4 UG/MG — SIGNIFICANT CHANGE UP (ref 0–30)
MONOCYTES # BLD AUTO: 1.18 K/UL — HIGH (ref 0–0.9)
MONOCYTES NFR BLD AUTO: 13.2 % — SIGNIFICANT CHANGE UP (ref 2–14)
NEUTROPHILS # BLD AUTO: 5.3 K/UL — SIGNIFICANT CHANGE UP (ref 1.8–7.4)
NEUTROPHILS NFR BLD AUTO: 59.4 % — SIGNIFICANT CHANGE UP (ref 43–77)
P-ANCA SER-ACNC: NEGATIVE — SIGNIFICANT CHANGE UP
PHOSPHATE SERPL-MCNC: 3.6 MG/DL — SIGNIFICANT CHANGE UP (ref 2.5–4.5)
PLATELET # BLD AUTO: 544 K/UL — HIGH (ref 150–400)
PMV BLD: 8.8 FL — SIGNIFICANT CHANGE UP (ref 7–13)
POTASSIUM SERPL-MCNC: 4.2 MMOL/L — SIGNIFICANT CHANGE UP (ref 3.5–5.3)
POTASSIUM SERPL-SCNC: 4.2 MMOL/L — SIGNIFICANT CHANGE UP (ref 3.5–5.3)
PROT SERPL-MCNC: 7.6 G/DL — SIGNIFICANT CHANGE UP (ref 6–8.3)
PROT UR-MCNC: 26.2 MG/DL — SIGNIFICANT CHANGE UP
RBC # BLD: 3.59 M/UL — LOW (ref 4.2–5.8)
RBC # FLD: 14.1 % — SIGNIFICANT CHANGE UP (ref 10.3–14.5)
SODIUM SERPL-SCNC: 139 MMOL/L — SIGNIFICANT CHANGE UP (ref 135–145)
SPECIMEN SOURCE: SIGNIFICANT CHANGE UP
WBC # BLD: 8.92 K/UL — SIGNIFICANT CHANGE UP (ref 3.8–10.5)
WBC # FLD AUTO: 8.92 K/UL — SIGNIFICANT CHANGE UP (ref 3.8–10.5)

## 2017-04-10 PROCEDURE — 99233 SBSQ HOSP IP/OBS HIGH 50: CPT | Mod: GC

## 2017-04-10 PROCEDURE — 99222 1ST HOSP IP/OBS MODERATE 55: CPT | Mod: GC

## 2017-04-10 PROCEDURE — 84166 PROTEIN E-PHORESIS/URINE/CSF: CPT | Mod: 26

## 2017-04-10 RX ORDER — FUROSEMIDE 40 MG
20 TABLET ORAL ONCE
Qty: 0 | Refills: 0 | Status: COMPLETED | OUTPATIENT
Start: 2017-04-10 | End: 2017-04-10

## 2017-04-10 RX ADMIN — Medication 650 MILLIGRAM(S): at 22:12

## 2017-04-10 RX ADMIN — Medication 650 MILLIGRAM(S): at 02:42

## 2017-04-10 RX ADMIN — Medication 650 MILLIGRAM(S): at 08:44

## 2017-04-10 RX ADMIN — Medication 650 MILLIGRAM(S): at 16:42

## 2017-04-10 RX ADMIN — Medication 650 MILLIGRAM(S): at 09:14

## 2017-04-10 RX ADMIN — Medication 650 MILLIGRAM(S): at 23:02

## 2017-04-10 RX ADMIN — Medication 20 MILLIGRAM(S): at 12:42

## 2017-04-10 RX ADMIN — HEPARIN SODIUM 5000 UNIT(S): 5000 INJECTION INTRAVENOUS; SUBCUTANEOUS at 21:29

## 2017-04-10 RX ADMIN — HEPARIN SODIUM 5000 UNIT(S): 5000 INJECTION INTRAVENOUS; SUBCUTANEOUS at 05:07

## 2017-04-10 RX ADMIN — Medication 650 MILLIGRAM(S): at 03:30

## 2017-04-10 RX ADMIN — Medication 650 MILLIGRAM(S): at 16:12

## 2017-04-10 RX ADMIN — HEPARIN SODIUM 5000 UNIT(S): 5000 INJECTION INTRAVENOUS; SUBCUTANEOUS at 13:26

## 2017-04-11 VITALS
SYSTOLIC BLOOD PRESSURE: 117 MMHG | RESPIRATION RATE: 18 BRPM | DIASTOLIC BLOOD PRESSURE: 71 MMHG | TEMPERATURE: 98 F | OXYGEN SATURATION: 97 % | HEART RATE: 83 BPM

## 2017-04-11 LAB
ANA TITR SER: NEGATIVE — SIGNIFICANT CHANGE UP
APPEARANCE UR: CLEAR — SIGNIFICANT CHANGE UP
B BURGDOR C6 AB SER-ACNC: NEGATIVE — SIGNIFICANT CHANGE UP
B BURGDOR IGG+IGM SER-ACNC: 0.05 INDEX — SIGNIFICANT CHANGE UP (ref 0.01–0.89)
BILIRUB UR-MCNC: NEGATIVE — SIGNIFICANT CHANGE UP
BLOOD UR QL VISUAL: NEGATIVE — SIGNIFICANT CHANGE UP
COLOR SPEC: SIGNIFICANT CHANGE UP
DSDNA AB SER-ACNC: <12 IU/ML — SIGNIFICANT CHANGE UP
ENA SCL70 AB SER-ACNC: <0.2 ZZ — SIGNIFICANT CHANGE UP
GLUCOSE UR-MCNC: NEGATIVE — SIGNIFICANT CHANGE UP
HIV1 AG SER QL: SIGNIFICANT CHANGE UP
HIV1+2 AB SPEC QL: SIGNIFICANT CHANGE UP
IRON SATN MFR SERPL: 15 UG/DL — LOW (ref 45–165)
IRON SATN MFR SERPL: 190 UG/DL — SIGNIFICANT CHANGE UP (ref 155–535)
KETONES UR-MCNC: NEGATIVE — SIGNIFICANT CHANGE UP
LDH SERPL L TO P-CCNC: 127 U/L — LOW (ref 135–225)
LEUKOCYTE ESTERASE UR-ACNC: NEGATIVE — SIGNIFICANT CHANGE UP
MUCOUS THREADS # UR AUTO: SIGNIFICANT CHANGE UP
NITRITE UR-MCNC: NEGATIVE — SIGNIFICANT CHANGE UP
PH UR: 6.5 — SIGNIFICANT CHANGE UP (ref 4.6–8)
PROT SERPL-MCNC: 7.6 G/DL — SIGNIFICANT CHANGE UP (ref 6–8.3)
PROT UR-MCNC: 9.8 MG/DL — SIGNIFICANT CHANGE UP
PROT UR-MCNC: NEGATIVE — SIGNIFICANT CHANGE UP
RBC CASTS # UR COMP ASSIST: SIGNIFICANT CHANGE UP (ref 0–?)
RETICS #: 38.7 10X3/UL — SIGNIFICANT CHANGE UP (ref 17–73)
RETICS/RBC NFR: 1.1 % — SIGNIFICANT CHANGE UP (ref 0.5–2.5)
SP GR SPEC: 1.01 — SIGNIFICANT CHANGE UP (ref 1–1.03)
UIBC SERPL-MCNC: 175 UG/DL — SIGNIFICANT CHANGE UP (ref 110–370)
UROBILINOGEN FLD QL: NORMAL E.U. — SIGNIFICANT CHANGE UP (ref 0.1–0.2)
WBC UR QL: SIGNIFICANT CHANGE UP (ref 0–?)

## 2017-04-11 PROCEDURE — 99239 HOSP IP/OBS DSCHRG MGMT >30: CPT

## 2017-04-11 PROCEDURE — 84165 PROTEIN E-PHORESIS SERUM: CPT | Mod: 26

## 2017-04-11 PROCEDURE — 86334 IMMUNOFIX E-PHORESIS SERUM: CPT | Mod: 26

## 2017-04-11 PROCEDURE — 84166 PROTEIN E-PHORESIS/URINE/CSF: CPT | Mod: 26

## 2017-04-11 RX ORDER — ACETAMINOPHEN 500 MG
2 TABLET ORAL
Qty: 0 | Refills: 0 | COMMUNITY
Start: 2017-04-11

## 2017-04-11 RX ADMIN — HEPARIN SODIUM 5000 UNIT(S): 5000 INJECTION INTRAVENOUS; SUBCUTANEOUS at 05:34

## 2017-04-11 RX ADMIN — Medication 650 MILLIGRAM(S): at 13:25

## 2017-04-11 RX ADMIN — Medication 650 MILLIGRAM(S): at 12:48

## 2017-04-11 RX ADMIN — HEPARIN SODIUM 5000 UNIT(S): 5000 INJECTION INTRAVENOUS; SUBCUTANEOUS at 15:52

## 2017-04-11 RX ADMIN — Medication 650 MILLIGRAM(S): at 06:30

## 2017-04-11 RX ADMIN — Medication 650 MILLIGRAM(S): at 05:34

## 2017-04-12 LAB
B19V DNA FLD QL NAA+PROBE: SIGNIFICANT CHANGE UP
B19V IGG SER QL: POSITIVE — SIGNIFICANT CHANGE UP
B19V IGG SER-ACNC: 8 INDEX — HIGH (ref 0–0.8)
B19V IGM FLD-ACNC: 0.4 INDEX — SIGNIFICANT CHANGE UP (ref 0–0.8)
B19V IGM SER-ACNC: NEGATIVE — SIGNIFICANT CHANGE UP
GAS PNL BLDMV: SIGNIFICANT CHANGE UP

## 2017-04-13 LAB
BACTERIA BLD CULT: SIGNIFICANT CHANGE UP
BACTERIA BLD CULT: SIGNIFICANT CHANGE UP
M TB TUBERC IFN-G BLD QL: -0.01 IU/ML — SIGNIFICANT CHANGE UP
M TB TUBERC IFN-G BLD QL: 0.07 IU/ML — SIGNIFICANT CHANGE UP
M TB TUBERC IFN-G BLD QL: SIGNIFICANT CHANGE UP
MITOGEN IGNF BCKGRD COR BLD-ACNC: 0.2 IU/ML — SIGNIFICANT CHANGE UP
N GONORRHOEA RRNA SPEC QL NAA+PROBE: SIGNIFICANT CHANGE UP
SPECIMEN SOURCE: SIGNIFICANT CHANGE UP

## 2017-04-14 LAB
CARDIOLIPIN IGM SER-MCNC: 3.13 MPL — SIGNIFICANT CHANGE UP (ref 0–11)
CARDIOLIPIN IGM SER-MCNC: 65.13 GPL — HIGH (ref 0–23)

## 2017-04-17 LAB
CCP AB SER-ACNC: SIGNIFICANT CHANGE UP
GAS PNL BLDMV: SIGNIFICANT CHANGE UP

## 2017-04-18 LAB — GAS PNL BLDMV: SIGNIFICANT CHANGE UP

## 2017-04-23 ENCOUNTER — EMERGENCY (EMERGENCY)
Facility: HOSPITAL | Age: 57
LOS: 1 days | Discharge: HOME | End: 2017-04-23
Attending: EMERGENCY MEDICINE | Admitting: EMERGENCY MEDICINE
Payer: COMMERCIAL

## 2017-04-23 VITALS
HEART RATE: 92 BPM | SYSTOLIC BLOOD PRESSURE: 156 MMHG | OXYGEN SATURATION: 98 % | TEMPERATURE: 98 F | DIASTOLIC BLOOD PRESSURE: 65 MMHG | RESPIRATION RATE: 18 BRPM

## 2017-04-23 VITALS
DIASTOLIC BLOOD PRESSURE: 88 MMHG | TEMPERATURE: 99 F | RESPIRATION RATE: 18 BRPM | OXYGEN SATURATION: 98 % | SYSTOLIC BLOOD PRESSURE: 150 MMHG | HEART RATE: 78 BPM

## 2017-04-23 DIAGNOSIS — S92.909A UNSPECIFIED FRACTURE OF UNSPECIFIED FOOT, INITIAL ENCOUNTER FOR CLOSED FRACTURE: Chronic | ICD-10-CM

## 2017-04-23 DIAGNOSIS — S73.192S OTHER SPRAIN OF LEFT HIP, SEQUELA: Chronic | ICD-10-CM

## 2017-04-23 PROBLEM — I10 ESSENTIAL (PRIMARY) HYPERTENSION: Chronic | Status: ACTIVE | Noted: 2017-04-08

## 2017-04-23 PROBLEM — I82.409 ACUTE EMBOLISM AND THROMBOSIS OF UNSPECIFIED DEEP VEINS OF UNSPECIFIED LOWER EXTREMITY: Chronic | Status: ACTIVE | Noted: 2017-04-08

## 2017-04-23 PROBLEM — N05.9 UNSPECIFIED NEPHRITIC SYNDROME WITH UNSPECIFIED MORPHOLOGIC CHANGES: Chronic | Status: ACTIVE | Noted: 2017-04-08

## 2017-04-23 LAB
ALBUMIN SERPL ELPH-MCNC: 3.5 G/DL — SIGNIFICANT CHANGE UP (ref 3.3–5)
ALP SERPL-CCNC: 63 U/L — SIGNIFICANT CHANGE UP (ref 40–120)
ALT FLD-CCNC: 15 U/L — SIGNIFICANT CHANGE UP (ref 4–41)
APTT BLD: 33.3 SEC — SIGNIFICANT CHANGE UP (ref 27.5–37.4)
AST SERPL-CCNC: 23 U/L — SIGNIFICANT CHANGE UP (ref 4–40)
BASE EXCESS BLDV CALC-SCNC: 3.3 MMOL/L — SIGNIFICANT CHANGE UP
BASOPHILS # BLD AUTO: 0.05 K/UL — SIGNIFICANT CHANGE UP (ref 0–0.2)
BASOPHILS NFR BLD AUTO: 0.4 % — SIGNIFICANT CHANGE UP (ref 0–2)
BILIRUB SERPL-MCNC: 0.2 MG/DL — SIGNIFICANT CHANGE UP (ref 0.2–1.2)
BLD GP AB SCN SERPL QL: NEGATIVE — SIGNIFICANT CHANGE UP
BLOOD GAS VENOUS - CREATININE: 0.66 MG/DL — SIGNIFICANT CHANGE UP (ref 0.5–1.3)
BUN SERPL-MCNC: 11 MG/DL — SIGNIFICANT CHANGE UP (ref 7–23)
CALCIUM SERPL-MCNC: 9.7 MG/DL — SIGNIFICANT CHANGE UP (ref 8.4–10.5)
CHLORIDE BLDV-SCNC: 106 MMOL/L — SIGNIFICANT CHANGE UP (ref 96–108)
CHLORIDE SERPL-SCNC: 100 MMOL/L — SIGNIFICANT CHANGE UP (ref 98–107)
CO2 SERPL-SCNC: 25 MMOL/L — SIGNIFICANT CHANGE UP (ref 22–31)
CREAT SERPL-MCNC: 0.79 MG/DL — SIGNIFICANT CHANGE UP (ref 0.5–1.3)
EOSINOPHIL # BLD AUTO: 0.09 K/UL — SIGNIFICANT CHANGE UP (ref 0–0.5)
EOSINOPHIL NFR BLD AUTO: 0.8 % — SIGNIFICANT CHANGE UP (ref 0–6)
GAS PNL BLDV: 138 MMOL/L — SIGNIFICANT CHANGE UP (ref 136–146)
GLUCOSE BLDV-MCNC: 121 — HIGH (ref 70–99)
GLUCOSE SERPL-MCNC: 118 MG/DL — HIGH (ref 70–99)
HCO3 BLDV-SCNC: 27 MMOL/L — SIGNIFICANT CHANGE UP (ref 20–27)
HCT VFR BLD CALC: 32.9 % — LOW (ref 39–50)
HCT VFR BLDV CALC: 33.6 % — LOW (ref 39–51)
HGB BLD-MCNC: 10.3 G/DL — LOW (ref 13–17)
HGB BLDV-MCNC: 10.9 G/DL — LOW (ref 13–17)
IMM GRANULOCYTES NFR BLD AUTO: 0.5 % — SIGNIFICANT CHANGE UP (ref 0–1.5)
INR BLD: 1.09 — SIGNIFICANT CHANGE UP (ref 0.88–1.17)
LACTATE BLDV-MCNC: 2.5 MMOL/L — HIGH (ref 0.5–2)
LYMPHOCYTES # BLD AUTO: 1.4 K/UL — SIGNIFICANT CHANGE UP (ref 1–3.3)
LYMPHOCYTES # BLD AUTO: 12 % — LOW (ref 13–44)
MCHC RBC-ENTMCNC: 25.8 PG — LOW (ref 27–34)
MCHC RBC-ENTMCNC: 31.3 % — LOW (ref 32–36)
MCV RBC AUTO: 82.3 FL — SIGNIFICANT CHANGE UP (ref 80–100)
MONOCYTES # BLD AUTO: 0.7 K/UL — SIGNIFICANT CHANGE UP (ref 0–0.9)
MONOCYTES NFR BLD AUTO: 6 % — SIGNIFICANT CHANGE UP (ref 2–14)
NEUTROPHILS # BLD AUTO: 9.36 K/UL — HIGH (ref 1.8–7.4)
NEUTROPHILS NFR BLD AUTO: 80.3 % — HIGH (ref 43–77)
PCO2 BLDV: 43 MMHG — SIGNIFICANT CHANGE UP (ref 41–51)
PH BLDV: 7.42 PH — SIGNIFICANT CHANGE UP (ref 7.32–7.43)
PLATELET # BLD AUTO: 876 K/UL — HIGH (ref 150–400)
PMV BLD: 8.7 FL — SIGNIFICANT CHANGE UP (ref 7–13)
PO2 BLDV: 54 MMHG — HIGH (ref 35–40)
POTASSIUM BLDV-SCNC: 4.2 MMOL/L — SIGNIFICANT CHANGE UP (ref 3.4–4.5)
POTASSIUM SERPL-MCNC: 4.5 MMOL/L — SIGNIFICANT CHANGE UP (ref 3.5–5.3)
POTASSIUM SERPL-SCNC: 4.5 MMOL/L — SIGNIFICANT CHANGE UP (ref 3.5–5.3)
PROT SERPL-MCNC: 8.6 G/DL — HIGH (ref 6–8.3)
PROTHROM AB SERPL-ACNC: 12.2 SEC — SIGNIFICANT CHANGE UP (ref 9.8–13.1)
RBC # BLD: 4 M/UL — LOW (ref 4.2–5.8)
RBC # FLD: 14.9 % — HIGH (ref 10.3–14.5)
RH IG SCN BLD-IMP: POSITIVE — SIGNIFICANT CHANGE UP
SAO2 % BLDV: 85.7 % — HIGH (ref 60–85)
SODIUM SERPL-SCNC: 141 MMOL/L — SIGNIFICANT CHANGE UP (ref 135–145)
WBC # BLD: 11.66 K/UL — HIGH (ref 3.8–10.5)
WBC # FLD AUTO: 11.66 K/UL — HIGH (ref 3.8–10.5)

## 2017-04-23 PROCEDURE — 70498 CT ANGIOGRAPHY NECK: CPT | Mod: 26,52

## 2017-04-23 PROCEDURE — 99285 EMERGENCY DEPT VISIT HI MDM: CPT | Mod: GC

## 2017-04-23 PROCEDURE — 99285 EMERGENCY DEPT VISIT HI MDM: CPT

## 2017-04-23 PROCEDURE — 70496 CT ANGIOGRAPHY HEAD: CPT | Mod: 26

## 2017-04-23 NOTE — ED PROVIDER NOTE - MEDICAL DECISION MAKING DETAILS
Platelet is 876 with mild headache and dizziness upon movement of head. Spoke with neuroradiologist who determined the best study to r/o microthrombi was a CTA head and neck, which was negative. Heme was consulted and stated this is most likely reactive to whatever underlying etiology is causing his other symptoms, recommend aspirin 81mg once a day, information for follow up with Dr. Lynch ([facility]) was given to patient.

## 2017-04-23 NOTE — ED PROVIDER NOTE - PROGRESS NOTE DETAILS
spoke with neuroradiology at [phone] in regards to the best study to r/o microemboli and they suggested CTA Head and neck. cordell: pt seen by hematology attending and fellow at bedside. Opinion was that thromosis is unlikely and that while  hematology follow-up is needed, the elevated plts are likely related to a reactive thrombocytosis, likely rheumatological.   As per hematology, If CTA negative, pt may be discharged from the ED, with low dose ASA to treat plts. CTA Head and neck negative for microthrombi

## 2017-04-23 NOTE — ED PROVIDER NOTE - ATTENDING CONTRIBUTION TO CARE
cordell: pt being evaluated for polyarthralgia including opt and inpt w/u. Plts have been high in the past (554 mid April) and pt is on 162mg ASA. HAs seen an opt rheumaologisty last week who considered psoriatic arthritis and started pt on low dose prednisone.  pt had labs 4/23 indicating plts 1.1 million and hct 32 and was told to go to ED (Hct is stable).  pt notes 'mild tension headache' and new sx of mild dizziness only when he moves his head side to side.   exam: unremarkable except for small area of ecchymosis rt arm.  Will recheck labs and if plt count was verified, contact hematology.

## 2017-04-23 NOTE — ED PROVIDER NOTE - OBJECTIVE STATEMENT
56YO M PMHx of HTN and recent diagnosis of psoriatic arthritis was sent in by PCP for platelet count of 1016. Patient c/o headache and feelings of "head rushing"/dizziness when he moves his head. Is currently on aspirin 81mg two tabs once a day, prednisone 5mg BID, and acetaminophen PRN pain. As per patient, he's been having arthalgias and myalgias since March. He was admitted on 4/9/17 and dc'd with a diagnosis of polyarticular arthritis. He subsequently saw a rheumatologist who diagnosed him with psoriatic arthritis. Was told by his PCP and rheumatologist that he needed an endoscopy to r/o gastric ulcer as a cause for his symptoms and to follow up with heme. Patient was going to fly to [facility] to work up his thrombocytosis. Grandma passed away from cancer (unknown primary). No personal h/o cancer. Lost 20 pounds in 2 months 2/2 decreased appetite.

## 2017-04-23 NOTE — ED ADULT NURSE NOTE - OBJECTIVE STATEMENT
Pt presents to ED R#23 AOx4, in NAD, sent in by PMD for high platelet count. Pt states he was admitted 1 month ago for "fever", prior to admission and in followup labs platelet count has been gradually increasing, has scheduled appt with hematologist in [facility], but advised to come straight to ED for high platelet count resulted in latest blood work. Pt reports swelling and joint pain in b/l upper and lower extremities x2 months, also s/o positional vertigo and HA worsening over last few days. Denies CP/ SOB/ N/V/D/ bloody stools/ abdominal pain/ fevers/ chills/ weakness/ other acute medical complaints at this time. IV inserted, BW collected and sent to lab, seen by Dr Dow. Awaiting BW results. Family at bedside. Will continue to monitor.

## 2017-04-23 NOTE — ED PROVIDER NOTE - CHIEF COMPLAINT
The patient is a 57y Male complaining of The patient is a 57y Male complaining of elevated platelet count

## 2017-04-23 NOTE — ED PROVIDER NOTE - PMH
DVT (deep venous thrombosis)  R calf  HTN (hypertension)    Metatarsal fracture, pathologic, right, sequela    Nephritis  as a child

## 2017-04-23 NOTE — ED ADULT TRIAGE NOTE - CHIEF COMPLAINT QUOTE
p/t c/o of joint pain and weakness for few days sent by pmd for high platelet count p/t denies any chest pain denies sob no neuro deficits noted

## 2017-04-23 NOTE — ED PROVIDER NOTE - NOTES
saw patient in regards to thrombocytosis. Believes this is all reactive to underlying etiology. If CTA negative for microthrombi, can be dc'd on antiplatelet with follow up for underlying etiology.

## 2018-09-27 NOTE — ED ADULT NURSE NOTE - PRIMARY CARE PROVIDER
Anesthesia Volume In Cc (Will Not Render If 0): 0.5 Dressing: bandage Anesthesia Type: 1% lidocaine with epinephrine and a 1:10 solution of 8.4% sodium bicarbonate Consent: Written consent was obtained and risks were reviewed including but not limited to scarring, infection, bleeding, scabbing, incomplete removal, nerve damage and allergy to anesthesia. Curettage Text: The wound bed was treated with curettage after the biopsy was performed. X Size Of Lesion In Cm: 0 Billing Type: Third-Party Bill Lab Facility: [id number] Wound Care: Aquaphor Was A Bandage Applied: Yes Biopsy Type: H and E Notification Instructions: Patient will be notified of biopsy results. However, patient instructed to call the office if not contacted within 2 weeks. Bill For Surgical Tray: no Depth Of Biopsy: dermis Size Of Lesion In Cm: 0.3 Biopsy Method: Dermablade Detail Level: Simple unknown Hemostasis: Drysol Silver Nitrate Text: The wound bed was treated with silver nitrate after the biopsy was performed. Electrodesiccation And Curettage Text: The wound bed was treated with electrodesiccation and curettage after the biopsy was performed. Lab: [id number] Electrodesiccation Text: The wound bed was treated with electrodesiccation after the biopsy was performed. Post-Care Instructions: I reviewed with the patient in detail post-care instructions. Patient is to keep the biopsy site dry overnight, and then apply bacitracin twice daily until healed. Patient may apply hydrogen peroxide soaks to remove any crusting. Type Of Destruction Used: Curettage Cryotherapy Text: The wound bed was treated with cryotherapy after the biopsy was performed.

## 2018-12-07 NOTE — ED PROVIDER NOTE - DISPOSITION TYPE
Progress Notes by Eitan Moon MD at 08/24/17 03:21 PM     Author:  Eitan Moon MD Service:  (none) Author Type:  Physician     Filed:  08/24/17 04:54 PM Encounter Date:  8/24/2017 Status:  Signed     :  Eitan Moon MD (Physician)              SUBJECTIVE:  This is a 72 year old patient status post[JA1.1T] PI[JA1.2M] in[JA1.1T] the left eye[JA1.2M] times[JA1.1T] 1 week and right eye times 1 month[JA1.2M].  Patient has no complaints.    OBJECTIVE:  Vision                       RIGHT:[JA1.1T] without correction  20/60[JA1.2M]  LEFT:[JA1.1T] without correction  20/60-[JA1.2M]  Electronically Signed by: Jesica Denton 8/24/2017 3:26 PM[JA1.2T]       Tonometry    Method[JA1.1T]  Goldmann with Flurox[BC1.1M]   Time: 3:22 PM  RIGHT:[JA1.1T]  20[BC1.1M] mm Hg  LEFT:[JA1.1T]  20[BC1.1M] mm Hg[JA1.1T]                         Anterior Chamber    Right: peripheral iridotomy patent temporally  Left:[BC1.1C] peripheral iridotomy patent temporally[BC1.1M]  Lens                        Right: - clear  Left: - clear[BC1.1C]  ASSESSMENT:  Impression - Status post[JA1.1T] PI[BC1.2M] in[JA1.1T] the left eye[BC1.2M] times[JA1.1T] 1 week[BC1.2M].  Pressure is[JA1.1T] stable[BC1.2M].    PLAN  Pred Forte[JA1.1T] twice a day left eye for 1 week then stop[BC1.2M].  Continue with[JA1.1T] timolol left eye for another week[BC1.2M]. Follow up in[JA1.1T] 1 month for dilation both eyes[BC1.2M].[JA1.1T]    Electronically Signed by:    Eitan Moon MD , 8/24/2017[BC1.3T]        Revision History        User Key Date/Time User Provider Type Action    > BC1.3 08/24/17 04:54 PM Eitan Moon MD Physician Sign     BC1.2 08/24/17 04:53 PM Eitan Moon MD Physician      BC1.1 08/24/17 03:39 PM Eitan Moon MD Physician      JA1.2 08/24/17 03:26 PM Jesica Denton Sign at close encounter     JA1.1 08/24/17 03:21 PM Jesica Denton Technician     C - Copied, M - Manual, T -  Template             DISCHARGE

## 2021-08-27 NOTE — H&P ADULT. - NEGATIVE GASTROINTESTINAL SYMPTOMS
PT DAILY TREATMENT NOTE     Patient Name: Zainab Pinto  Date:2021  : 1943  [x]  Patient  Verified  [place]or: [place] / Plan: [place] MED[place] PART A & B / Product Type: Medicare /    In time:9:59  Out time:10:51  Total Treatment Time (min): 46  Visit #: 4 of 5    Medicare/BCBS Only   Total Timed Codes (min):  42 1:1 Treatment Time:  42       Treatment Area: Cervicalgia [M54.2]  Dorsalgia, unspecified [M54.9]    SUBJECTIVE  Pain Level (0-10 scale): 0/10  Any medication changes, allergies to medications, adverse drug reactions, diagnosis change, or new procedure performed?: [x] No    [] Yes (see summary sheet for update)  Subjective functional status/changes:   [] No changes reported  \"Doing okay. Coming along. \"    OBJECTIVE    Modality rationale: decrease pain and increase tissue extensibility to improve the patients ability to perform ADL's.    Min Type Additional Details    [] Estim:  []Unatt       []IFC  []Premod                        []Other:  []w/ice   []w/heat  Position:  Location:    [] Estim: []Att    []TENS instruct  []NMES                    []Other:  []w/US   []w/ice   []w/heat  Position:  Location:    []  Traction: [] Cervical       []Lumbar                       [] Prone          []Supine                       []Intermittent   []Continuous Lbs:  [] before manual  [] after manual    []  Ultrasound: []Continuous   [] Pulsed                           []1MHz   []3MHz W/cm2:  Location:    []  Iontophoresis with dexamethasone         Location: [] Take home patch   [] In clinic   10 []  Ice     [x]  heat  []  Ice massage  []  Laser   []  Anodyne Position: Supine with wedge  Location: C/S and L/S    []  Laser with stim  []  Other:  Position:  Location:    []  Vasopneumatic Device    []  Right     []  Left  Pre-treatment girth:  Post-treatment girth:  Measured at (location):  Pressure:       [] lo [] med [] hi   Temperature: [] lo [] med [] hi   [] Skin assessment post-treatment:  []intact []redness- no adverse reaction    []redness  adverse reaction:     34 min Therapeutic Exercise:  [x] See flow sheet :   Rationale: increase ROM and increase strength to improve the patients ability to perform ADL's.     8 min Neuromuscular Re-education:  [x]  See flow sheet : Hook-lying core stabs. Rationale: increase strength, improve coordination and increase proprioception  to improve the patients ability to perform functional activities. With   [x] TE   [] TA   [] neuro   [] other: Patient Education: [x] Review HEP    [] Progressed/Changed HEP based on:   [] positioning   [] body mechanics   [] transfers   [] heat/ice application    [] other:      Other Objective/Functional Measures: AROM C/S extension 40 degrees with 4/10 pain. Added peach t-band resistance to hip 3-way. Added 1# to 1/2 prone LE series. Pain Level (0-10 scale) post treatment: 0/10    ASSESSMENT/Changes in Function: Pt fully participated in treatment. Demonstrates improved C/S extension, pain with end range. Mild mm soreness with stretching, primarily with QL stretch on 1/2 barrel. Continue PT to further decrease mm restrictions and increase stability/strength to improve ease of performing functional activities. Patient will continue to benefit from skilled PT services to modify and progress therapeutic interventions, address functional mobility deficits, address ROM deficits, address strength deficits, analyze and cue movement patterns and analyze and modify body mechanics/ergonomics to attain remaining goals. [x]  See Plan of Care  []  See progress note/recertification  []  See Discharge Summary         Progress towards goals / Updated goals:  Short Term Goals: To be accomplished in 2 weeks:  1. Pt will demonstrate I and compliance with HEP to maximize therapeutic effect.              HI: HEP issued and instructed              [id number]: Has not attempted. 8/9/2021  2.  Pt will demonstrate B hip flexion PROM to 100 deg without increased back pain to improve dressing.              IE: >90 deg with back pain              Current: PROM hip flexion (B) 120 degrees with slight low back discomfort. 8/20/2021  Long Term Goals: To be accomplished in 5 weeks:  1. Pt will demonstrate trunk flexion and B SB to 75% of WNL without back pain for improved ADL ease.              IE: 75% of WNL each but reports of back pain  2. Pt will demonstrate B hip flexion strength 4+/5 without back pain to improve ease of sit to stands.              IE: 4+/5 with back pain reported midline  3. Pt will report ability to ambulate for 45 minutes without increased back pain to improve ADL ease.              IE: 20 min ambulation              Current: Met, pt reports being able to walk for 60 minutes without back pain increasing. 8/20/2021  4. Pt will improve cervical extension AROM to 35 deg without increased pain to improve ease of self care.              IE: 25 deg with neck pain    Current: AROM C/S extension 40 degrees with 4/10 pain.  8/27/2021    PLAN  []  Upgrade activities as tolerated     [x]  Continue plan of care  []  Update interventions per flow sheet       []  Discharge due to:_  []  Other:_      Sammie Doll PTA 8/27/2021  9:37 AM    Future Appointments   Date Time Provider Caron Truongi   8/27/2021 10:00 AM Chao Maldonado PTA [facility]   9/3/2021  9:00 AM Racheal Lovell [facility]   9/17/2021  9:15 [facility]   11/9/2021 10:00 AM Bernarda Ferguson MD [facility] BS AMB   2/17/2022  9:20 AM Mary Burnham DO [facility] BS AMB   2/17/2022  9:30 AM CSI, PACER [facility] BS AMB no nausea/no vomiting/no melena/no hematochezia/no abdominal pain

## 2024-01-11 NOTE — PATIENT PROFILE ADULT. - AS SC BRADEN ACTIVITY
Consent: Written consent obtained, risks reviewed including but not limited to crusting, scabbing, blistering, scarring, darker or lighter pigmentary change, paradoxical hair regrowth, incomplete removal of hair and infection. Fluence (Will Not Render If 0): 20 Treatment Number: 0 Tolerated Procedure (Optional): Tolerated Well Were Eye Shields Employed?: No Post-Care Instructions: I reviewed with the patient in detail post-care instructions. Patient should avoid sun for a minimum of 4 weeks before and after treatment. Pre-Procedure: Prior to proceeding the treatment areas were cleaned and all present put on their eye protection. Detail Level: Detailed Treatment Number: 4 Fluence (Will Not Render If 0): 26 Eye Shield Text: Given the treatment area eye shields were inserted prior to treatment. Number Of Prepaid Treatments (Will Not Render If 0): 6 Shaving (Optional): The patient shaved at home Post-Procedure Care: Immediate endpoint: perifollicular erythema and edema. Vaseline and ice applied. Post care reviewed with patient. Laser Type: diode 810nm Price (Use Numbers Only, No Special Characters Or $): 723 (3) walks occasionally